# Patient Record
Sex: FEMALE | Race: WHITE | NOT HISPANIC OR LATINO | Employment: OTHER | URBAN - METROPOLITAN AREA
[De-identification: names, ages, dates, MRNs, and addresses within clinical notes are randomized per-mention and may not be internally consistent; named-entity substitution may affect disease eponyms.]

---

## 2019-04-23 PROCEDURE — 99282 EMERGENCY DEPT VISIT SF MDM: CPT

## 2019-04-23 PROCEDURE — 99284 PR EMERGENCY DEPT VISIT,LEVEL IV: ICD-10-PCS | Mod: ,,, | Performed by: EMERGENCY MEDICINE

## 2019-04-23 PROCEDURE — 99284 EMERGENCY DEPT VISIT MOD MDM: CPT | Mod: ,,, | Performed by: EMERGENCY MEDICINE

## 2019-04-24 ENCOUNTER — HOSPITAL ENCOUNTER (EMERGENCY)
Facility: HOSPITAL | Age: 59
Discharge: HOME | End: 2019-04-24
Attending: EMERGENCY MEDICINE
Payer: MEDICARE

## 2019-04-24 VITALS
TEMPERATURE: 98 F | WEIGHT: 173 LBS | HEART RATE: 70 BPM | SYSTOLIC BLOOD PRESSURE: 122 MMHG | HEIGHT: 64 IN | DIASTOLIC BLOOD PRESSURE: 70 MMHG | BODY MASS INDEX: 29.53 KG/M2 | RESPIRATION RATE: 14 BRPM | OXYGEN SATURATION: 98 %

## 2019-04-24 DIAGNOSIS — R53.1 WEAKNESS: Primary | ICD-10-CM

## 2019-04-24 NOTE — ED TRIAGE NOTES
"Andreina Norris, an 58 y.o. female presents to the ED after her son called EMS for her complaint of dizziness and "upset stomach."  On initial assessment, patient is falling asleep and snoring as I was interviewing her and states that she takes multiple narcotic pain medications and benzos that are prescribed by her pain management doctor.  Patient self reports history of stroke with baseline deficits to the left side.       Review of patient's allergies indicates:  Allergies not on file  Chief Complaint   Patient presents with    Dizziness     began last night; reports nausea; denies emesis or diarrhea     No past medical history on file.    "

## 2019-04-24 NOTE — ED PROVIDER NOTES
Encounter Date: 4/23/2019    SCRIBE #1 NOTE: I, Vivien Hood, am scribing for, and in the presence of,  Dr. Trujillo. I have scribed the entire note.       History     Chief Complaint   Patient presents with    Dizziness     began last night; reports nausea; denies emesis or diarrhea     Time patient was seen by the provider: 1:25 AM      The patient is a 58 y.o. female with co-morbidities including: stroke and back pain who presents to the ED with a complaint of dizziness. Patient was brought to the ED via EMS after her son called reporting that she became pale and dizzy. Patient notes that the dizziness began last night but has gotten better since arriving at the ED. She now complains of right ided shoulder pain that is exacerbated when she moves. She denies dizziness or epigastric pain.     The history is provided by the patient.     Review of patient's allergies indicates:  No Known Allergies  Past Medical History:   Diagnosis Date    Back pain     Stroke      No past surgical history on file.  No family history on file.  Social History     Tobacco Use    Smoking status: Not on file   Substance Use Topics    Alcohol use: Not on file    Drug use: Not on file     Review of Systems  General: No fever.  No chills.  Eyes: No visual changes.  Head: No headache.    Integument: No rashes or lesions.  Chest: No shortness of breath.  Cardiovascular: No chest pain.  Abdomen: No abdominal pain.  No nausea or vomiting.  Urinary: No abnormal urination.  Neurologic: No focal weakness.  No numbness.  Hematologic: No easy bruising.  Endocrine: No excessive thirst or urination.    Physical Exam     Initial Vitals [04/23/19 2358]   BP Pulse Resp Temp SpO2   122/70 70 14 98 °F (36.7 °C) 98 %      MAP       --         Physical Exam    Nursing note and vitals reviewed.    Appearance: No acute distress.  Skin: No rashes seen.  Good turgor.  No abrasions.  No ecchymoses.  Eyes: No conjunctival injection.  ENT: Oropharynx clear.     Chest: Clear to auscultation bilaterally.  Good air movement.  No wheezes.  No rhonchi.  Cardiovascular: Regular rate and rhythm.  No murmurs. No gallops. No rubs.  Abdomen: Soft.  Not distended.  Nontender.  No guarding.  No rebound. No Masses  Musculoskeletal: Good range of motion all joints.  No deformities.  Neck supple.  No meningismus. Mild tenderness over anterior right shoulder. Contractive left upper and lower extremities.   Neurologic: Equal strength in upper and lower extremities bilaterally.  Normal sensation.  No facial droop.  Normal speech.    Mental Status:  Alert and oriented x 3.  Appropriate, conversant.    ED Course   Procedures  Labs Reviewed - No data to display       Imaging Results    None          Medical Decision Making:   History:   Old Medical Records: I decided to obtain old medical records.  ED Management:  Ptatient here initially with dizziness and epigastric discomfort, however, both have resolved since arriving to the ED. Stable vitals. She began complaining of right shoulder pain which resolved when I repositioned her. She states that she works a lot and is known to overwork herself which may have caused her symptoms. I feel ACS, stroke, or medication overuse are less likely. Stable for discharge.    Advised pt to follow up with PCP or return if concerning symptoms arise. Pt understands and agrees with plan. Will d/c home.                Scribe Attestation:   Scribe #1: I performed the above scribed service and the documentation accurately describes the services I performed. I attest to the accuracy of the note.               Clinical Impression:       ICD-10-CM ICD-9-CM   1. Weakness R53.1 780.79         Disposition:   Disposition: Discharged  Condition: Stable                        Akash Trujillo MD  04/24/19 1455

## 2020-02-19 ENCOUNTER — TELEPHONE (OUTPATIENT)
Dept: PLASTIC SURGERY | Facility: CLINIC | Age: 60
End: 2020-02-19

## 2020-02-19 NOTE — TELEPHONE ENCOUNTER
Attempted to contact pt to schedule.  Pt was not available at the time of the call.  I left a detailed VM asking pt to call PLS at her convenience.

## 2025-05-19 ENCOUNTER — OFFICE VISIT (OUTPATIENT)
Dept: FAMILY MEDICINE | Facility: CLINIC | Age: 65
End: 2025-05-19
Payer: MEDICARE

## 2025-05-19 VITALS
WEIGHT: 151.25 LBS | BODY MASS INDEX: 25.82 KG/M2 | HEIGHT: 64 IN | TEMPERATURE: 99 F | DIASTOLIC BLOOD PRESSURE: 82 MMHG | SYSTOLIC BLOOD PRESSURE: 124 MMHG | OXYGEN SATURATION: 97 % | RESPIRATION RATE: 18 BRPM | HEART RATE: 73 BPM

## 2025-05-19 DIAGNOSIS — I69.354 HEMIPLEGIA AND HEMIPARESIS FOLLOWING CEREBRAL INFARCTION AFFECTING LEFT NON-DOMINANT SIDE: ICD-10-CM

## 2025-05-19 DIAGNOSIS — M62.838 MUSCLE SPASM OF BOTH LOWER LEGS: ICD-10-CM

## 2025-05-19 DIAGNOSIS — J44.9 STAGE 4 VERY SEVERE COPD BY GOLD CLASSIFICATION: Primary | ICD-10-CM

## 2025-05-19 DIAGNOSIS — Z95.5 CORONARY ARTERY DISEASE STATUS POST CORONARY STENT INSERTION: ICD-10-CM

## 2025-05-19 DIAGNOSIS — I50.22 CHRONIC SYSTOLIC CONGESTIVE HEART FAILURE: ICD-10-CM

## 2025-05-19 DIAGNOSIS — I25.10 CORONARY ARTERY DISEASE STATUS POST CORONARY STENT INSERTION: ICD-10-CM

## 2025-05-19 DIAGNOSIS — M47.816 LUMBAR SPONDYLOSIS: ICD-10-CM

## 2025-05-19 DIAGNOSIS — F33.1 MODERATE EPISODE OF RECURRENT MAJOR DEPRESSIVE DISORDER: ICD-10-CM

## 2025-05-19 DIAGNOSIS — Z53.20 MAMMOGRAM DECLINED: ICD-10-CM

## 2025-05-19 DIAGNOSIS — M54.12 CERVICAL RADICULOPATHY: ICD-10-CM

## 2025-05-19 DIAGNOSIS — F17.210 CIGARETTE NICOTINE DEPENDENCE WITHOUT COMPLICATION: ICD-10-CM

## 2025-05-19 PROBLEM — R56.9 CONVULSIONS, UNSPECIFIED CONVULSION TYPE: Status: RESOLVED | Noted: 2025-05-19 | Resolved: 2025-05-19

## 2025-05-19 PROBLEM — R56.9 CONVULSIONS, UNSPECIFIED CONVULSION TYPE: Status: ACTIVE | Noted: 2025-05-19

## 2025-05-19 PROCEDURE — 3008F BODY MASS INDEX DOCD: CPT | Mod: CPTII,S$GLB,,

## 2025-05-19 PROCEDURE — 99406 BEHAV CHNG SMOKING 3-10 MIN: CPT | Mod: S$GLB,,,

## 2025-05-19 PROCEDURE — 99999 PR PBB SHADOW E&M-NEW PATIENT-LVL III: CPT | Mod: PBBFAC,,,

## 2025-05-19 PROCEDURE — 1159F MED LIST DOCD IN RCRD: CPT | Mod: CPTII,S$GLB,,

## 2025-05-19 PROCEDURE — 3079F DIAST BP 80-89 MM HG: CPT | Mod: CPTII,S$GLB,,

## 2025-05-19 PROCEDURE — 3074F SYST BP LT 130 MM HG: CPT | Mod: CPTII,S$GLB,,

## 2025-05-19 PROCEDURE — 99204 OFFICE O/P NEW MOD 45 MIN: CPT | Mod: 25,S$GLB,,

## 2025-05-19 RX ORDER — GABAPENTIN 800 MG/1
800 TABLET ORAL 3 TIMES DAILY
Qty: 90 TABLET | Refills: 11 | Status: SHIPPED | OUTPATIENT
Start: 2025-05-19 | End: 2026-05-19

## 2025-05-19 RX ORDER — MELOXICAM 15 MG/1
15 TABLET ORAL DAILY PRN
COMMUNITY
Start: 2025-03-15 | End: 2025-05-19 | Stop reason: SDUPTHER

## 2025-05-19 RX ORDER — GABAPENTIN 800 MG/1
800 TABLET ORAL
COMMUNITY
End: 2025-05-19 | Stop reason: ALTCHOICE

## 2025-05-19 RX ORDER — ROSUVASTATIN CALCIUM 40 MG/1
40 TABLET, COATED ORAL
COMMUNITY

## 2025-05-19 RX ORDER — BACLOFEN 20 MG/1
20 TABLET ORAL 3 TIMES DAILY
Qty: 90 TABLET | Refills: 4 | Status: SHIPPED | OUTPATIENT
Start: 2025-05-19 | End: 2025-10-16

## 2025-05-19 RX ORDER — CLOPIDOGREL BISULFATE 75 MG/1
75 TABLET ORAL DAILY
Qty: 90 TABLET | Refills: 3 | Status: SHIPPED | OUTPATIENT
Start: 2025-05-19 | End: 2026-05-14

## 2025-05-19 RX ORDER — VENLAFAXINE 75 MG/1
75 TABLET ORAL DAILY
COMMUNITY
End: 2025-05-21 | Stop reason: SDUPTHER

## 2025-05-19 RX ORDER — MELOXICAM 15 MG/1
15 TABLET ORAL DAILY PRN
Qty: 30 TABLET | Refills: 4 | Status: SHIPPED | OUTPATIENT
Start: 2025-05-19

## 2025-05-19 RX ORDER — METOPROLOL SUCCINATE 50 MG/1
50 TABLET, EXTENDED RELEASE ORAL
COMMUNITY
End: 2025-05-19 | Stop reason: SDUPTHER

## 2025-05-19 RX ORDER — FUROSEMIDE 20 MG/1
20 TABLET ORAL DAILY PRN
COMMUNITY
End: 2025-05-19 | Stop reason: SDUPTHER

## 2025-05-19 RX ORDER — METOPROLOL SUCCINATE 50 MG/1
50 TABLET, EXTENDED RELEASE ORAL DAILY
Qty: 90 TABLET | Refills: 3 | Status: SHIPPED | OUTPATIENT
Start: 2025-05-19

## 2025-05-19 RX ORDER — BACLOFEN 20 MG/1
20 TABLET ORAL 3 TIMES DAILY
COMMUNITY
Start: 2025-02-20 | End: 2025-05-19 | Stop reason: SDUPTHER

## 2025-05-19 RX ORDER — DIAZEPAM 5 MG/1
5 TABLET ORAL
COMMUNITY

## 2025-05-19 RX ORDER — CLOPIDOGREL BISULFATE 75 MG/1
75 TABLET ORAL
COMMUNITY
End: 2025-05-19 | Stop reason: SDUPTHER

## 2025-05-19 RX ORDER — CITALOPRAM 40 MG/1
TABLET ORAL
COMMUNITY

## 2025-05-19 RX ORDER — FUROSEMIDE 20 MG/1
20 TABLET ORAL DAILY PRN
Qty: 90 TABLET | Refills: 0 | Status: SHIPPED | OUTPATIENT
Start: 2025-05-19

## 2025-05-19 NOTE — PROGRESS NOTES
Family Medicine     Patient name: Andreina Norris  MRN: [id number]  : 1960  PCP NAME: Jared St NP    Active Problem List with Overview Notes    Diagnosis Date Noted    Chronic systolic congestive heart failure 2025    Hemiplegia and hemiparesis following cerebral infarction affecting left non-dominant side 2025    Moderate episode of recurrent major depressive disorder 2025    Stage 4 very severe COPD by GOLD classification 2025    Muscle spasm of both lower legs 2025    Cervical radiculopathy 2025    Lumbar spondylosis 2025    Coronary artery disease status post coronary stent insertion 2025    Mammogram declined 2025       History of Present Illness    Patient presents today to obtain medication refills.    She last saw her primary care provider Jared Walters, at [facility] in March.    She has a history of three strokes: first in  during childbirth, second in , and possibly a third in , resulting in left-sided weakness affecting her arm and leg. Her cardiac history is significant for three stents. She has been diagnosed with COPD and uses samples of Trelegy.  She underwent hip surgery last summer.    She has a history of back pain and has completed both home and patient therapy twice. She previously underwent pain management from 9735-0617. She takes Tylenol Arthritis as needed for pain relief.    She takes Effexor 75mg daily, Citalopram, and Valium for anxiety; Metoprolol 50mg for blood pressure/heart; Plavix; Lasix 20mg for fluid retention; Rosuvastatin; Baclofen 3 times daily for muscle spasms; Gabapentin 800mg 3 times daily for pain; Meloxicam; and Trelegy inhaler.    She is a current smoker, having reduced consumption from one pack to 5-6 cigarettes per day. She participates in the Quit Now smoking cessation program but reports two relapses.    She declines any preventative screening, blood work and  "vaccinations today.      ROS:  General: -fever, -chills, -fatigue, -weight gain, -weight loss  Eyes: -vision changes, -redness, -discharge  ENT: -ear pain, -nasal congestion, -sore throat  Cardiovascular: -chest pain, -palpitations, +lower extremity edema  Respiratory: -cough, -shortness of breath  Gastrointestinal: -abdominal pain, -nausea, -vomiting, -diarrhea, -constipation, -blood in stool  Genitourinary: -dysuria, -hematuria, -frequency  Musculoskeletal: +joint pain, -muscle pain, +muscle spasms, +back pain, +neck pain, +pain with movement, +limb pain  Skin: -rash, -lesion  Neurological: -headache, -dizziness, -numbness, -tingling  Psychiatric: +anxiety, +depression, -sleep difficulty          Past Medical History:   Diagnosis Date    Back pain     Stroke        History reviewed. No pertinent surgical history.     No family history on file.     Social History     Socioeconomic History    Marital status:      Social Drivers of Health     Food Insecurity: No Food Insecurity (12/19/2024)    Received from [facility]    Hunger Vital Sign     Worried About Running Out of Food in the Last Year: Never true     Ran Out of Food in the Last Year: Never true   Transportation Needs: No Transportation Needs (12/19/2024)    Received from [facility]    PRAPARE - Transportation     Lack of Transportation (Medical): No     Lack of Transportation (Non-Medical): No   Housing Stability: Low Risk  (12/19/2024)    Received from [facility]    Housing Stability Vital Sign     Unable to Pay for Housing in the Last Year: No     Number of Times Moved in the Last Year: 0     Homeless in the Last Year: No       /82   Pulse 73   Temp 98.5 °F (36.9 °C) (Oral)   Resp 18   Ht 5' 4" (1.626 m)   Wt 68.6 kg (151 lb 3.8 oz)   SpO2 97%   BMI 25.96 kg/m²     Physical Exam    General: No acute distress. Well-developed. Well-nourished.  Eyes: EOMI. Sclerae anicteric.  HENT: Normocephalic. Atraumatic. Nares patent. Moist oral " "mucosa.  Ears: Bilateral TMs clear. Bilateral EACs clear.  Cardiovascular: Regular rate. Regular rhythm. No murmurs. No rubs. No gallops. Normal S1, S2.  Respiratory: Normal respiratory effort. Clear to auscultation bilaterally. No rales. No rhonchi. No wheezing.  Abdomen: Soft. Non-tender. Non-distended. Normoactive bowel sounds.  Musculoskeletal: No  obvious deformity.  Extremities: No lower extremity edema.  Neurological: Alert & oriented x3.  Left hemiplegia.   Psychiatric: Normal mood. Normal affect. Good insight. Good judgment.  Skin: Warm. Dry. No rash.            Assessment & Plan           Andreina Gill" was seen today for establish care and fatigue.    Diagnoses and all orders for this visit:    Stage 4 very severe COPD by GOLD classification        No recent exacerbations.  Uses Trelegy.        Advised to continue participation in the Quit Now smoking cessation program.    Chronic systolic congestive heart failure  - Continued Lasix 20 mg for prn diuresis.   - Patient has 3 stents in the heart and is on rosuvastatin.  - Prescribed metoprolol 50 mg for heart and blood pressure.   -     metoprolol succinate (TOPROL-XL) 50 MG 24 hr tablet; Take 1 tablet (50 mg total) by mouth once daily.  -     furosemide (LASIX) 20 MG tablet; Take 1 tablet (20 mg total) by mouth daily as needed (leg swelling).    Hemiplegia and hemiparesis following cerebral infarction affecting left non-dominant side  -     clopidogreL (PLAVIX) 75 mg tablet; Take 1 tablet (75 mg total) by mouth once daily.    Moderate episode of recurrent major depressive disorder           Reports that she uses citalopram and Effexor and Valium.  I informed that I could not prescribe Valium.  She declines referral to Psychiatry at this point.    Muscle spasm of both lower legs  - Continued baclofen for muscle spasms, to be taken 3 times daily.  -     baclofen (LIORESAL) 20 MG tablet; Take 1 tablet (20 mg total) by mouth 3 (three) times " daily.    Cervical radiculopathy  - Continued gabapentin 800 mg to be taken 3 times daily and Meloxicam for chronic back pain.  - Patient also takes Tylenol Arthritis for pain management.  - Discussed previous treatments including physical therapy and pain management.  Was previously on Percocets.  I informed her that I could not give narcotics.  She declined referral to pain Medicine thanks  -     gabapentin (NEURONTIN) 800 MG tablet; Take 1 tablet (800 mg total) by mouth 3 (three) times daily.    Lumbar spondylosis  -     gabapentin (NEURONTIN) 800 MG tablet; Take 1 tablet (800 mg total) by mouth 3 (three) times daily.    Coronary artery disease status post coronary stent insertion        - Patient is on rosuvastatin for hyperlipidemia management.       - s/p 3 stents.       -  continue rosuvastatin  Cigarette nicotine dependence without complication      Assistance with smoking cessation was offered, including:  []  Medications  [x]  Counseling  []  Printed Information on Smoking Cessation  []  Referral to a Smoking Cessation Program    Patient was counseled regarding smoking for 5 minutes.    Mammogram declined    Other orders  -     meloxicam (MOBIC) 15 MG tablet; Take 1 tablet (15 mg total) by mouth daily as needed for Pain.         Problem List Items Addressed This Visit       Chronic systolic congestive heart failure    Relevant Medications    metoprolol succinate (TOPROL-XL) 50 MG 24 hr tablet    furosemide (LASIX) 20 MG tablet    Hemiplegia and hemiparesis following cerebral infarction affecting left non-dominant side    Relevant Medications    clopidogreL (PLAVIX) 75 mg tablet    Moderate episode of recurrent major depressive disorder    Stage 4 very severe COPD by GOLD classification - Primary    Muscle spasm of both lower legs    Relevant Medications    baclofen (LIORESAL) 20 MG tablet    Cervical radiculopathy    Relevant Medications    gabapentin (NEURONTIN) 800 MG tablet    Lumbar spondylosis     Relevant Medications    gabapentin (NEURONTIN) 800 MG tablet    Coronary artery disease status post coronary stent insertion (Chronic)    Mammogram declined     Other Visit Diagnoses         Cigarette nicotine dependence without complication                  Medication List with Changes/Refills   New Medications    GABAPENTIN (NEURONTIN) 800 MG TABLET    Take 1 tablet (800 mg total) by mouth 3 (three) times daily.   Current Medications    CITALOPRAM (CELEXA) 40 MG TABLET    0.5 tablet Orally Once a day    ROSUVASTATIN (CRESTOR) 40 MG TAB    Take 40 mg by mouth.    VALIUM 5 MG TABLET    Take 5 mg by mouth.    VENLAFAXINE (EFFEXOR) 75 MG TABLET    Take 75 mg by mouth once daily.   Changed and/or Refilled Medications    Modified Medication Previous Medication    BACLOFEN (LIORESAL) 20 MG TABLET baclofen (LIORESAL) 20 MG tablet       Take 1 tablet (20 mg total) by mouth 3 (three) times daily.    Take 20 mg by mouth 3 (three) times daily.    CLOPIDOGREL (PLAVIX) 75 MG TABLET clopidogreL (PLAVIX) 75 mg tablet       Take 1 tablet (75 mg total) by mouth once daily.    Take 75 mg by mouth.    FUROSEMIDE (LASIX) 20 MG TABLET furosemide (LASIX) 20 MG tablet       Take 1 tablet (20 mg total) by mouth daily as needed (leg swelling).    Take 20 mg by mouth daily as needed (leg swelling).    MELOXICAM (MOBIC) 15 MG TABLET meloxicam (MOBIC) 15 MG tablet       Take 1 tablet (15 mg total) by mouth daily as needed for Pain.    Take 15 mg by mouth daily as needed.    METOPROLOL SUCCINATE (TOPROL-XL) 50 MG 24 HR TABLET metoprolol succinate (TOPROL-XL) 50 MG 24 hr tablet       Take 1 tablet (50 mg total) by mouth once daily.    Take 50 mg by mouth.   Discontinued Medications    GABAPENTIN (NEURONTIN) 800 MG TABLET    Take 800 mg by mouth.         No follow-ups on file.    Genny Kinney MD        This note was generated with the assistance of ambient listening technology. Verbal consent was obtained by the patient and  accompanying visitor(s) for the recording of patient appointment to facilitate this note. I attest to having reviewed and edited the generated note for accuracy, though some syntax or spelling errors may persist. Please contact the author of this note for any clarification.

## 2025-05-19 NOTE — PROGRESS NOTES
Health Maintenance Due   Topic Date Due    Hepatitis C Screening  Consult PCP     Cervical Cancer Screening  Consult PCP     HIV Screening  Consult PCP     TETANUS VACCINE  Consult PCP     Mammogram  Consult PCP     Colorectal Cancer Screening  Consult PCP     Shingles Vaccine (1 of 2) Consult PCP     Pneumococcal Vaccines (Age 50+) (1 of 1 - PCV) Consult PCP     COVID-19 Vaccine (3 - 2024-25 season) Consult PCP

## 2025-05-21 DIAGNOSIS — F33.1 MODERATE EPISODE OF RECURRENT MAJOR DEPRESSIVE DISORDER: Primary | ICD-10-CM

## 2025-05-21 RX ORDER — VENLAFAXINE 75 MG/1
75 TABLET ORAL DAILY
Qty: 90 TABLET | Refills: 1 | Status: SHIPPED | OUTPATIENT
Start: 2025-05-21 | End: 2025-05-22 | Stop reason: SDUPTHER

## 2025-05-22 RX ORDER — VENLAFAXINE HYDROCHLORIDE 75 MG/1
75 CAPSULE, EXTENDED RELEASE ORAL DAILY
Qty: 30 CAPSULE | Refills: 11 | Status: SHIPPED | OUTPATIENT
Start: 2025-05-22 | End: 2026-05-22

## 2025-05-22 NOTE — TELEPHONE ENCOUNTER
----- Message from Enrique sent at 5/22/2025  8:07 AM CDT -----  Regarding: Andreina  Type: Patient Callback Who called: Andreina What is the request in detail: Patient stated that the doctor put in for the prescription, venlafaxine (EFFEXOR) 75 MG tablet, but it was not the right one. She needs the extended release. Please reach out to the patient for assistance. Can the clinic reply by [facility]? No Would the patient rather a call back or a response via My [facility]? Callback Best call back number: [phone] Information:

## 2025-06-06 ENCOUNTER — TELEPHONE (OUTPATIENT)
Dept: FAMILY MEDICINE | Facility: CLINIC | Age: 65
End: 2025-06-06
Payer: MEDICARE

## 2025-06-06 NOTE — TELEPHONE ENCOUNTER
----- Message from Francesca sent at 6/6/2025  4:01 PM CDT -----  Regarding: Andreina  Who Called: MaryRefill or New Rx: RefillRX Name and Strength:meloxicam (MOBIC) 15 MG tabletIs this a 30 day or 90 day RX:Preferred Pharmacy with phone number:[facility] [place] - [address] [place], [place] [address] [address]Toqse: [phone] Fax: [phone] the patient rather a call back or a response via [facility]? CallbackBest Call Back Number:.[phone] Information:

## 2025-06-09 RX ORDER — MELOXICAM 15 MG/1
15 TABLET ORAL DAILY PRN
Qty: 30 TABLET | Refills: 5 | Status: SHIPPED | OUTPATIENT
Start: 2025-06-09 | End: 2025-06-09 | Stop reason: SDUPTHER

## 2025-06-09 RX ORDER — MELOXICAM 15 MG/1
15 TABLET ORAL DAILY PRN
Qty: 30 TABLET | Refills: 5 | Status: SHIPPED | OUTPATIENT
Start: 2025-06-09

## 2025-06-09 NOTE — TELEPHONE ENCOUNTER
----- Message from Minerva sent at 6/6/2025  3:03 PM CDT -----  Type: RX Refill Request Who Called: Self  Have you contacted your pharmacy: Refill or New Rx: refill  RX Name and Strength: meloxicam (MOBIC) 15 MG tablet How is the patient currently taking it? (ex. 1XDay): Is this a 30 day or 90 day RX: Preferred Pharmacy with phone number: [facility] - [place] - [address] [place] [address] [place] [address]Xixrg: [phone] Fax: [phone] or Mail Order: local  Ordering Provider: Would the patient rather a call back or a response [facility]? Call back  Best Call Back Number: [phone] Additional Information:

## 2025-06-09 NOTE — TELEPHONE ENCOUNTER
Care Due:                  Date            Visit Type   Department     Provider  --------------------------------------------------------------------------------                                NP -                              PRIMARY      [facility]  Last Visit: 05-      CARE ([facility])   MEDICINE       Genny Kinney  Next Visit: None Scheduled  None         None Found                                                            Last  Test          Frequency    Reason                     Performed    Due Date  --------------------------------------------------------------------------------    CBC.........  12 months..  clopidogreL, meloxicam...  Not Found    Overdue    CMP.........  12 months..  furosemide, meloxicam,     Not Found    Overdue                             venlafaxine..............    [employer] Embedded Care Due Messages. Reference number: [id number].   6/09/2025 7:50:54 AM CDT